# Patient Record
Sex: FEMALE | Race: WHITE | NOT HISPANIC OR LATINO | Employment: OTHER | URBAN - METROPOLITAN AREA
[De-identification: names, ages, dates, MRNs, and addresses within clinical notes are randomized per-mention and may not be internally consistent; named-entity substitution may affect disease eponyms.]

---

## 2019-03-29 ENCOUNTER — OFFICE VISIT - HEALTHEAST (OUTPATIENT)
Dept: ALLERGY | Facility: CLINIC | Age: 34
End: 2019-03-29

## 2019-03-29 DIAGNOSIS — Z91.018 FOOD ALLERGY: ICD-10-CM

## 2019-03-29 RX ORDER — PREDNISONE 20 MG/1
TABLET ORAL
Refills: 0 | Status: SHIPPED | COMMUNITY
Start: 2019-03-24 | End: 2021-11-11

## 2019-03-29 ASSESSMENT — MIFFLIN-ST. JEOR: SCORE: 1388.07

## 2019-04-01 ENCOUNTER — COMMUNICATION - HEALTHEAST (OUTPATIENT)
Dept: ALLERGY | Facility: CLINIC | Age: 34
End: 2019-04-01

## 2019-04-01 LAB
CARROT IGE QN: <0.35 KU/L
DEPRECATED MISC ALLERGEN IGE RAST QL: NORMAL
EGG WHITE IGE QN: <0.35 KU/L
Lab: <0.1 KU/L
PEANUT IGE QN: <0.35 KU/L
PORK IGE QN: <0.1 KU/L
SESAME SEED IGE QN: <0.1 KU(A)/L

## 2019-04-02 ENCOUNTER — COMMUNICATION - HEALTHEAST (OUTPATIENT)
Dept: ALLERGY | Facility: CLINIC | Age: 34
End: 2019-04-02

## 2021-05-27 NOTE — PROGRESS NOTES
Chief complaint: 2 episodes of anaphylaxis    History of present illness: This is a pleasant 34-year-old woman who is here today for evaluation of 2 episodes of anaphylaxis.  She reports that last Tuesday, March 19 she ate a sausage egg sandwich at [facility] around 1030.  She then states that 1 PM she went home.  She had eggs with some salsa perhaps and everything but the bagel seasoning.  She also had around 2:05 PM carrots with some peanut butter.  Around 2:26 PM, she felt a headache, some stomach pain, had diarrhea and developed hives all over her body.  She had also had that day alcohol he follow rice, venison pork sausage cheddar sticks that were homemade as well as some homemade hummus which contained garlic and sesame.  She went to the urgent care in her local area.  There she was noted to have a low oxygen saturation per the patient.  She was given Benadryl but was not given epinephrine.  She was also given steroids.  She states she had a steroid shot there and then was prescribed prednisone for 5 days.  She then notes the following Sunday, she had hives return after eating peanut butter, eggs and cauliflower rice.  She has had hives one other time in her life.  She states this is when she had mastitis. It was on the last day of the penicillin course and was attributed to the penicillin.  She states she did not take any medications the original day of the reaction.  She was not sick at that time.  She can think of nothing else unusual she did that day.  She does not have a history of environmental allergies or asthma.  She said no recurrence of the hives since last Sunday.  She does have a current epinephrine device.  This was prescribed in the urgent care.    Past medical history: Otherwise unremarkable, she did deliver a baby 6 months ago.    Social history:, No recent changes at home, works from home often she is a , no pets at home, central air, basement, non-smoker, no exposure to  "mold    Family history: No history of unexplained hives, no history of asthma or allergies    Review of Systems performed as above and the remainder is negative.          Current Outpatient Medications:      cholecalciferol, vitamin D3, 5,000 unit Tab, Vitamin D3 5,000 unit tablet  Take by oral route., Disp: , Rfl:      predniSONE (DELTASONE) 20 MG tablet, TK 3 TS FOR 3 DAYS. THEN TK 2 TS FOR 3 DAYS. THEN TK 1 T FOR 3 DAYS. THEN TK SS T FOR 2 DAYS, Disp: , Rfl: 0     prenatal 25/iron fum/folic/dha (PRENATAL-1 ORAL), Prenatal  one tablet each day, Disp: , Rfl:     Allergies   Allergen Reactions     Penicillins Hives     All \"cillins\"     Sulfa (Sulfonamide Antibiotics)        BP 92/60   Pulse 62   Resp 16   Ht 5' 6\" (1.676 m)   Wt 148 lb (67.1 kg)   BMI 23.89 kg/m    Gen: Pleasant female not in acute distress  HEENT: Eyes no erythema of the bulbar or palpebral conjunctiva, no edemaMouth: Throat clear, no lip or tongue edema.     Skin: No rashes or lesions  Psych: Alert and oriented times 3    8 percutaneous tests were placed.  Negative histamine control.    Impression report and plan:  1.  Anaphylaxis    Given her diarrhea, stomach pain and hives, this appears to have been anaphylaxis.  I am not sure what to make of the low oxygen saturation, however.  She states she was not wheezing and felt that she could breathe but had a little bit of tightness in her throat.  Continue to carry her epinephrine device for now.  I would like to check specific IgE for the foods that we did attempt to do skin testing today.  She had a negative histamine control today.  I would like her to avoid peanut and sesame, however, she states she might of had some sesame yesterday.  If negative, consider retest and perhaps challenge in 1 month.  Anaphylaxis action plan reviewed.  Stated that hives can come and go up to 6 weeks from an initial attack.  She will notify me if symptoms return.     Time spent with the patient, 30 minutes, " greater than half spent counseling and coordination of care regarding anaphylaxis and food allergy.

## 2021-05-27 NOTE — PATIENT INSTRUCTIONS - HE
Avoid peanut, sesame, carrot, pork, milk, egg, cauliflower for now    Check blood test    Epi at all times    If negative, retest peanut/sesame in 1 month and consider challenge

## 2021-05-30 ENCOUNTER — RECORDS - HEALTHEAST (OUTPATIENT)
Dept: ADMINISTRATIVE | Facility: CLINIC | Age: 36
End: 2021-05-30

## 2021-06-02 VITALS — WEIGHT: 148 LBS | BODY MASS INDEX: 23.78 KG/M2 | HEIGHT: 66 IN

## 2021-08-15 ENCOUNTER — HEALTH MAINTENANCE LETTER (OUTPATIENT)
Age: 36
End: 2021-08-15

## 2021-10-11 ENCOUNTER — HEALTH MAINTENANCE LETTER (OUTPATIENT)
Age: 36
End: 2021-10-11

## 2021-11-09 ENCOUNTER — LAB (OUTPATIENT)
Dept: LAB | Facility: OTHER | Age: 36
End: 2021-11-09
Attending: PLASTIC SURGERY
Payer: COMMERCIAL

## 2021-11-09 DIAGNOSIS — Z11.59 ENCOUNTER FOR SCREENING FOR OTHER VIRAL DISEASES: ICD-10-CM

## 2021-11-09 PROCEDURE — U0005 INFEC AGEN DETEC AMPLI PROBE: HCPCS

## 2021-11-09 PROCEDURE — U0003 INFECTIOUS AGENT DETECTION BY NUCLEIC ACID (DNA OR RNA); SEVERE ACUTE RESPIRATORY SYNDROME CORONAVIRUS 2 (SARS-COV-2) (CORONAVIRUS DISEASE [COVID-19]), AMPLIFIED PROBE TECHNIQUE, MAKING USE OF HIGH THROUGHPUT TECHNOLOGIES AS DESCRIBED BY CMS-2020-01-R: HCPCS

## 2021-11-10 ENCOUNTER — ANESTHESIA EVENT (OUTPATIENT)
Dept: SURGERY | Facility: AMBULATORY SURGERY CENTER | Age: 36
End: 2021-11-10

## 2021-11-10 LAB — SARS-COV-2 RNA RESP QL NAA+PROBE: NEGATIVE

## 2021-11-10 ASSESSMENT — MIFFLIN-ST. JEOR: SCORE: 1351.79

## 2021-11-11 ENCOUNTER — HOSPITAL ENCOUNTER (OUTPATIENT)
Facility: AMBULATORY SURGERY CENTER | Age: 36
End: 2021-11-11
Attending: PLASTIC SURGERY | Admitting: PLASTIC SURGERY
Payer: COMMERCIAL

## 2021-11-11 ENCOUNTER — ANESTHESIA (OUTPATIENT)
Dept: SURGERY | Facility: AMBULATORY SURGERY CENTER | Age: 36
End: 2021-11-11

## 2021-11-11 VITALS
HEART RATE: 64 BPM | RESPIRATION RATE: 16 BRPM | WEIGHT: 140 LBS | HEIGHT: 66 IN | SYSTOLIC BLOOD PRESSURE: 109 MMHG | DIASTOLIC BLOOD PRESSURE: 64 MMHG | OXYGEN SATURATION: 99 % | TEMPERATURE: 97.1 F | BODY MASS INDEX: 22.5 KG/M2

## 2021-11-11 DIAGNOSIS — R11.0 NAUSEA AFTER ANESTHESIA, INITIAL ENCOUNTER: ICD-10-CM

## 2021-11-11 DIAGNOSIS — R52 PAIN: Primary | ICD-10-CM

## 2021-11-11 DIAGNOSIS — T88.59XA NAUSEA AFTER ANESTHESIA, INITIAL ENCOUNTER: ICD-10-CM

## 2021-11-11 LAB — HCG UR QL: NEGATIVE

## 2021-11-11 PROCEDURE — G8916 PT W IV AB GIVEN ON TIME: HCPCS

## 2021-11-11 PROCEDURE — L8600 IMPLANT BREAST SILICONE/EQ: HCPCS

## 2021-11-11 PROCEDURE — 360N000090 HC SURGERY LEVEL COSMETIC 150 MIN

## 2021-11-11 PROCEDURE — G8907 PT DOC NO EVENTS ON DISCHARG: HCPCS

## 2021-11-11 PROCEDURE — 81025 URINE PREGNANCY TEST: CPT | Performed by: ANESTHESIOLOGY

## 2021-11-11 DEVICE — IMPLANTABLE DEVICE: Type: IMPLANTABLE DEVICE | Site: BREAST | Status: FUNCTIONAL

## 2021-11-11 RX ORDER — ONDANSETRON 2 MG/ML
INJECTION INTRAMUSCULAR; INTRAVENOUS PRN
Status: DISCONTINUED | OUTPATIENT
Start: 2021-11-11 | End: 2021-11-11

## 2021-11-11 RX ORDER — HYDROXYZINE HYDROCHLORIDE 25 MG/1
25 TABLET, FILM COATED ORAL
Status: DISCONTINUED | OUTPATIENT
Start: 2021-11-11 | End: 2021-11-12 | Stop reason: HOSPADM

## 2021-11-11 RX ORDER — FENTANYL CITRATE 50 UG/ML
25 INJECTION, SOLUTION INTRAMUSCULAR; INTRAVENOUS
Status: DISCONTINUED | OUTPATIENT
Start: 2021-11-11 | End: 2021-11-12 | Stop reason: HOSPADM

## 2021-11-11 RX ORDER — LIDOCAINE 40 MG/G
CREAM TOPICAL
Status: DISCONTINUED | OUTPATIENT
Start: 2021-11-11 | End: 2021-11-12 | Stop reason: HOSPADM

## 2021-11-11 RX ORDER — MEPERIDINE HYDROCHLORIDE 25 MG/ML
12.5 INJECTION INTRAMUSCULAR; INTRAVENOUS; SUBCUTANEOUS
Status: DISCONTINUED | OUTPATIENT
Start: 2021-11-11 | End: 2021-11-12 | Stop reason: HOSPADM

## 2021-11-11 RX ORDER — CEFAZOLIN SODIUM 2 G/100ML
2 INJECTION, SOLUTION INTRAVENOUS
Status: COMPLETED | OUTPATIENT
Start: 2021-11-11 | End: 2021-11-11

## 2021-11-11 RX ORDER — ONDANSETRON 4 MG/1
4 TABLET, ORALLY DISINTEGRATING ORAL EVERY 30 MIN PRN
Status: DISCONTINUED | OUTPATIENT
Start: 2021-11-11 | End: 2021-11-12 | Stop reason: HOSPADM

## 2021-11-11 RX ORDER — BUPIVACAINE HYDROCHLORIDE AND EPINEPHRINE 2.5; 5 MG/ML; UG/ML
INJECTION, SOLUTION INFILTRATION; PERINEURAL PRN
Status: DISCONTINUED | OUTPATIENT
Start: 2021-11-11 | End: 2021-11-11 | Stop reason: HOSPADM

## 2021-11-11 RX ORDER — DEXAMETHASONE SODIUM PHOSPHATE 4 MG/ML
10 INJECTION, SOLUTION INTRA-ARTICULAR; INTRALESIONAL; INTRAMUSCULAR; INTRAVENOUS; SOFT TISSUE
Status: COMPLETED | OUTPATIENT
Start: 2021-11-11 | End: 2021-11-11

## 2021-11-11 RX ORDER — ONDANSETRON 4 MG/1
4-8 TABLET, ORALLY DISINTEGRATING ORAL EVERY 8 HOURS PRN
Qty: 4 TABLET | Refills: 0
Start: 2021-11-11

## 2021-11-11 RX ORDER — SODIUM CHLORIDE, SODIUM LACTATE, POTASSIUM CHLORIDE, CALCIUM CHLORIDE 600; 310; 30; 20 MG/100ML; MG/100ML; MG/100ML; MG/100ML
INJECTION, SOLUTION INTRAVENOUS CONTINUOUS
Status: DISCONTINUED | OUTPATIENT
Start: 2021-11-11 | End: 2021-11-12 | Stop reason: HOSPADM

## 2021-11-11 RX ORDER — ACETAMINOPHEN 325 MG/1
975 TABLET ORAL ONCE
Status: COMPLETED | OUTPATIENT
Start: 2021-11-11 | End: 2021-11-11

## 2021-11-11 RX ORDER — HYDROXYZINE PAMOATE 25 MG/1
25 CAPSULE ORAL EVERY 6 HOURS PRN
Qty: 30 CAPSULE | Refills: 0
Start: 2021-11-11

## 2021-11-11 RX ORDER — OXYCODONE AND ACETAMINOPHEN 5; 325 MG/1; MG/1
2 TABLET ORAL
Status: DISCONTINUED | OUTPATIENT
Start: 2021-11-11 | End: 2021-11-12 | Stop reason: HOSPADM

## 2021-11-11 RX ORDER — ONDANSETRON 4 MG/1
4 TABLET, ORALLY DISINTEGRATING ORAL
Status: DISCONTINUED | OUTPATIENT
Start: 2021-11-11 | End: 2021-11-12 | Stop reason: HOSPADM

## 2021-11-11 RX ORDER — PROPOFOL 10 MG/ML
INJECTION, EMULSION INTRAVENOUS PRN
Status: DISCONTINUED | OUTPATIENT
Start: 2021-11-11 | End: 2021-11-11

## 2021-11-11 RX ORDER — CEFADROXIL 500 MG/1
500 CAPSULE ORAL EVERY 12 HOURS SCHEDULED
Status: DISCONTINUED | OUTPATIENT
Start: 2021-11-11 | End: 2021-11-12 | Stop reason: HOSPADM

## 2021-11-11 RX ORDER — FENTANYL CITRATE 50 UG/ML
INJECTION, SOLUTION INTRAMUSCULAR; INTRAVENOUS PRN
Status: DISCONTINUED | OUTPATIENT
Start: 2021-11-11 | End: 2021-11-11

## 2021-11-11 RX ORDER — OXYCODONE HYDROCHLORIDE 5 MG/1
5 TABLET ORAL EVERY 4 HOURS PRN
Status: DISCONTINUED | OUTPATIENT
Start: 2021-11-11 | End: 2021-11-12 | Stop reason: HOSPADM

## 2021-11-11 RX ORDER — LIDOCAINE HYDROCHLORIDE 20 MG/ML
INJECTION, SOLUTION INFILTRATION; PERINEURAL PRN
Status: DISCONTINUED | OUTPATIENT
Start: 2021-11-11 | End: 2021-11-11

## 2021-11-11 RX ORDER — OXYCODONE AND ACETAMINOPHEN 5; 325 MG/1; MG/1
1-2 TABLET ORAL EVERY 4 HOURS PRN
Status: DISCONTINUED | OUTPATIENT
Start: 2021-11-11 | End: 2021-11-12 | Stop reason: HOSPADM

## 2021-11-11 RX ORDER — ONDANSETRON 2 MG/ML
4 INJECTION INTRAMUSCULAR; INTRAVENOUS EVERY 30 MIN PRN
Status: DISCONTINUED | OUTPATIENT
Start: 2021-11-11 | End: 2021-11-12 | Stop reason: HOSPADM

## 2021-11-11 RX ORDER — FENTANYL CITRATE 50 UG/ML
25 INJECTION, SOLUTION INTRAMUSCULAR; INTRAVENOUS EVERY 5 MIN PRN
Status: DISCONTINUED | OUTPATIENT
Start: 2021-11-11 | End: 2021-11-12 | Stop reason: HOSPADM

## 2021-11-11 RX ORDER — EPHEDRINE SULFATE 50 MG/ML
INJECTION, SOLUTION INTRAMUSCULAR; INTRAVENOUS; SUBCUTANEOUS PRN
Status: DISCONTINUED | OUTPATIENT
Start: 2021-11-11 | End: 2021-11-11

## 2021-11-11 RX ORDER — PROPOFOL 10 MG/ML
INJECTION, EMULSION INTRAVENOUS CONTINUOUS PRN
Status: DISCONTINUED | OUTPATIENT
Start: 2021-11-11 | End: 2021-11-11

## 2021-11-11 RX ORDER — DIAZEPAM 5 MG
10 TABLET ORAL EVERY 12 HOURS PRN
Status: DISCONTINUED | OUTPATIENT
Start: 2021-11-11 | End: 2021-11-12 | Stop reason: HOSPADM

## 2021-11-11 RX ADMIN — EPHEDRINE SULFATE 10 MG: 50 INJECTION, SOLUTION INTRAMUSCULAR; INTRAVENOUS; SUBCUTANEOUS at 07:31

## 2021-11-11 RX ADMIN — FENTANYL CITRATE 75 MCG: 50 INJECTION, SOLUTION INTRAMUSCULAR; INTRAVENOUS at 07:22

## 2021-11-11 RX ADMIN — LIDOCAINE HYDROCHLORIDE 60 MG: 20 INJECTION, SOLUTION INFILTRATION; PERINEURAL at 07:05

## 2021-11-11 RX ADMIN — PROPOFOL 200 MCG/KG/MIN: 10 INJECTION, EMULSION INTRAVENOUS at 07:06

## 2021-11-11 RX ADMIN — CEFAZOLIN SODIUM 2 G: 2 INJECTION, SOLUTION INTRAVENOUS at 06:50

## 2021-11-11 RX ADMIN — Medication 15 MG: at 07:42

## 2021-11-11 RX ADMIN — FENTANYL CITRATE 25 MCG: 50 INJECTION, SOLUTION INTRAMUSCULAR; INTRAVENOUS at 07:05

## 2021-11-11 RX ADMIN — ONDANSETRON 4 MG: 2 INJECTION INTRAMUSCULAR; INTRAVENOUS at 09:26

## 2021-11-11 RX ADMIN — PROPOFOL 200 MG: 10 INJECTION, EMULSION INTRAVENOUS at 07:05

## 2021-11-11 RX ADMIN — Medication 1 MG: at 08:35

## 2021-11-11 RX ADMIN — SODIUM CHLORIDE, SODIUM LACTATE, POTASSIUM CHLORIDE, CALCIUM CHLORIDE: 600; 310; 30; 20 INJECTION, SOLUTION INTRAVENOUS at 06:28

## 2021-11-11 RX ADMIN — ACETAMINOPHEN 975 MG: 325 TABLET ORAL at 06:30

## 2021-11-11 RX ADMIN — DEXAMETHASONE SODIUM PHOSPHATE 10 MG: 4 INJECTION, SOLUTION INTRA-ARTICULAR; INTRALESIONAL; INTRAMUSCULAR; INTRAVENOUS; SOFT TISSUE at 07:16

## 2021-11-11 RX ADMIN — Medication 20 MG: at 07:27

## 2021-11-11 NOTE — BRIEF OP NOTE
[facility] Brief Operative Note    Pre-operative diagnosis: Hypomastia and grade I ptosis; localized fat accumulation of preaxilla   Post-operative diagnosis same   Procedure: Procedure(s):  Bilateral breast augmentation  Bilateral crescent mastopexies  LIPOSUCTION of Preaxillas   Surgeon(s): Surgeon(s) and Role:     * Elliot Vargas MD - Primary   Estimated blood loss: minimal   Specimens: none   Findings: none   Assist: Enriqueta Alvarez  Complications: none  Condition: extubated and stable to PAR    Elliot Vargas MD

## 2021-11-11 NOTE — ANESTHESIA PROCEDURE NOTES
Airway       Patient location during procedure: OR       Procedure Start/Stop Times: 11/11/2021 7:06 AM  Staff -        CRNA: Callie Araujo APRN CRNA       Performed By: anesthesiologist  Consent for Airway        Urgency: elective  Indications and Patient Condition       Indications for airway management: kamilla-procedural       Induction type:intravenous      Final Airway Details       Final airway type: supraglottic airway    Supraglottic Airway Details        Type: LMA       Brand: LMA Unique       LMA size: 4       Airway Seal Pressure (cm H2O): 18    Post intubation assessment        Placement verified by: capnometry, equal breath sounds and chest rise        Number of attempts at approach: 1       Number of other approaches attempted: 0       Secured with: commercial tube cabrera and plastic tape       Ease of procedure: easy       Dentition: Intact and Unchanged

## 2021-11-11 NOTE — ANESTHESIA CARE TRANSFER NOTE
Patient: Jazmyne Headley    Procedure: Procedure(s):  Bilateral breast augmentation  Bilateral crescent mastopexies  LIPOSUCTION of Preaxillas       Diagnosis: Encounter for cosmetic surgery [Z41.1]  Diagnosis Additional Information: No value filed.    Anesthesia Type:   General     Note:    Oropharynx: oropharynx clear of all foreign objects  Level of Consciousness: awake  Oxygen Supplementation: nasal cannula  Level of Supplemental Oxygen (L/min / FiO2): 3  Independent Airway: airway patency satisfactory and stable  Dentition: dentition unchanged  Vital Signs Stable: post-procedure vital signs reviewed and stable  Report to RN Given: handoff report given  Patient transferred to: PACU  Comments: Prior to atraumatic LMA removal, patient awake, good aeration, SpO2 99%, equal bilateral breath sounds.  Transported to PACU on oxygen  3lpm.  Patient awake, alert, SpO2 98% in PACU. No apparent anesthesia complications.   Handoff Report: Identifed the Patient, Identified the Reponsible Provider, Reviewed the pertinent medical history, Discussed the surgical course, Reviewed Intra-OP anesthesia mangement and issues during anesthesia, Set expectations for post-procedure period and Allowed opportunity for questions and acknowledgement of understanding      Vitals:  Vitals Value Taken Time   BP     Temp     Pulse 70 11/11/21 0948   Resp 14 11/11/21 0948   SpO2 100 % 11/11/21 0948   Vitals shown include unvalidated device data.    Electronically Signed By: BEAR Baca CRNA  November 11, 2021  9:49 AM

## 2021-11-11 NOTE — ANESTHESIA PREPROCEDURE EVALUATION
"Anesthesia Pre-Procedure Evaluation    Patient: Jazmyne Headley   MRN: [id number] : 1985        Preoperative Diagnosis: Encounter for cosmetic surgery [Z41.1]    Procedure : Procedure(s):  Bilateral breast augmentation  Bilateral crescent mastopexies  LIPOSUCTION of Preaxillas          History reviewed. No pertinent past medical history.   History reviewed. No pertinent surgical history.   Allergies   Allergen Reactions     Amoxicillin Hives     Penicillins Hives     All \"cillins\"     Sulfa (Sulfonamide Antibiotics) [Sulfa Drugs] Unknown      Social History     Tobacco Use     Smoking status: Never Smoker     Smokeless tobacco: Never Used   Substance Use Topics     Alcohol use: Not on file      Wt Readings from Last 1 Encounters:   11/10/21 63.5 kg (140 lb)        Anesthesia Evaluation   Pt has had prior anesthetic. Type: General.    No history of anesthetic complications       ROS/MED HX  ENT/Pulmonary:  - neg pulmonary ROS     Neurologic:  - neg neurologic ROS     Cardiovascular:  - neg cardiovascular ROS     METS/Exercise Tolerance:     Hematologic:  - neg hematologic  ROS     Musculoskeletal:  - neg musculoskeletal ROS     GI/Hepatic:  - neg GI/hepatic ROS     Renal/Genitourinary:  - neg Renal ROS     Endo:  - neg endo ROS     Psychiatric/Substance Use:  - neg psychiatric ROS     Infectious Disease:  - neg infectious disease ROS     Malignancy:  - neg malignancy ROS     Other:  - neg other ROS          Physical Exam    Airway  airway exam normal           Respiratory Devices and Support         Dental  no notable dental history         Cardiovascular   cardiovascular exam normal          Pulmonary   pulmonary exam normal                OUTSIDE LABS:  CBC: No results found for: WBC, HGB, HCT, PLT  BMP: No results found for: NA, POTASSIUM, CHLORIDE, CO2, BUN, CR, GLC  COAGS: No results found for: PTT, INR, FIBR  POC:   Lab Results   Component Value Date    HCG Negative 2021     HEPATIC: No results " found for: ALBUMIN, PROTTOTAL, ALT, AST, GGT, ALKPHOS, BILITOTAL, BILIDIRECT, DIONNE  OTHER:   Lab Results   Component Value Date    A1C 5.3 03/04/2016       Anesthesia Plan    ASA Status:  1   NPO Status:  NPO Appropriate    Anesthesia Type: General.     - Airway: LMA   Induction: Intravenous, Propofol.   Maintenance: Balanced.        Consents    Anesthesia Plan(s) and associated risks, benefits, and realistic alternatives discussed. Questions answered and patient/representative(s) expressed understanding.     - Discussed with:  Patient      - Extended Intubation/Ventilatory Support Discussed: No.      - Patient is DNR/DNI Status: No    Use of blood products discussed: No .     Postoperative Care    Pain management: IV analgesics, Oral pain medications.   PONV prophylaxis: Ondansetron (or other 5HT-3), Background Propofol Infusion     Comments:                Geoffrey Horowitz MD

## 2021-11-11 NOTE — DISCHARGE INSTRUCTIONS
[facility]  Same-Day Surgery   Adult Discharge Orders & Instructions   For 24 hours after surgery  1. Get plenty of rest.  A responsible adult must stay with you for at least 24 hours after you leave the hospital.   2. Do not drive or use heavy equipment.  If you have weakness or tingling, don't drive or use heavy equipment until this feeling goes away.  3. Do not drink alcohol.  4. Avoid strenuous or risky activities.  Ask for help when climbing stairs.   5. You may feel lightheaded.  IF so, sit for a few minutes before standing.  Have someone help you get up.   6. If you have nausea (feel sick to your stomach): Drink only clear liquids such as apple juice, ginger ale, broth or 7-Up.  Rest may also help.  Be sure to drink enough fluids.  Move to a regular diet as you feel able.  7. You may have a slight fever. Call the doctor if your fever is over 100 F (37.7 C) (taken under the tongue) or lasts longer than 24 hours.  8. You may have a dry mouth, a sore throat, muscle aches or trouble sleeping.  These should go away after 24 hours.  9. Do not make important or legal decisions.   Call your doctor for any of the followin.  Signs of infection (fever, growing tenderness at the surgery site, a large amount of drainage or bleeding, severe pain, foul-smelling drainage, redness, swelling).    2. It has been over 8 to 10 hours since surgery and you are still not able to urinate (pass water).    3.  Headache for over 24 hours.    4.  Numbness, tingling or weakness the day after surgery (if you had spinal anesthesia).

## 2021-11-11 NOTE — ANESTHESIA POSTPROCEDURE EVALUATION
Patient: Jazmyne Headley    Procedure: Procedure(s):  Bilateral breast augmentation  Bilateral crescent mastopexies  LIPOSUCTION of Preaxillas       Diagnosis:Encounter for cosmetic surgery [Z41.1]  Diagnosis Additional Information: No value filed.    Anesthesia Type:  General    Note:  Disposition: Outpatient   Postop Pain Control: Uneventful            Sign Out: Well controlled pain   PONV: No   Neuro/Psych: Uneventful            Sign Out: Acceptable/Baseline neuro status   Airway/Respiratory: Uneventful            Sign Out: Acceptable/Baseline resp. status   CV/Hemodynamics: Uneventful            Sign Out: Acceptable CV status; No obvious hypovolemia; No obvious fluid overload   Other NRE: NONE   DID A NON-ROUTINE EVENT OCCUR? No           Last vitals:  Vitals Value Taken Time   /75 11/11/21 1015   Temp 97  F (36.1  C) 11/11/21 0946   Pulse 54 11/11/21 1015   Resp 13 11/11/21 1015   SpO2 100 % 11/11/21 1015   Vitals shown include unvalidated device data.    Electronically Signed By: Geoffrey Horowitz MD  November 11, 2021  3:20 PM

## 2021-11-13 NOTE — OP NOTE
PREOPERATIVE DIAGNOSES:   1.  Bilateral hypomastia.   2.  Significant postpartum involution.   3. Grade 1 1/2 breast ptosis  4. Localized fat accumulation of bra strap area   POSTOPERATIVE DIAGNOSES:     1.  Bilateral hypomastia.   2.  Significant postpartum involution.    3. Grade 1 1/2 breast ptosis  4. Localized fat accumulation of bra strap area     PROCEDURES PERFORMED:   1. Bilateral augmentation mammoplasty through an inframammary fold incision with the implant in a submuscular position.      2. Bilateral [place] mastopexies  3. Liposuction of bra strap areas.    IMPLANTS:     1.  Left breast implant: Sientra HSC smooth round high profile, high strength cohesive gel breast implant.  Reference number [id number], serial number is [id number].   2.  Right breast implant:  Sientra HSC smooth round high profile cohesive gel breast implant, reference number [id number], serial number is [id number].      SURGEON:  Elliot Vargas MD      ANESTHETIC:  General, utilizing a laryngeal masked airway.      INDICATIONS FOR PROCEDURE:  The patient is a very pleasant, attractive 36-year-old female who has seen us in consultation regarding breast augmentation.  She has a grade 1 1/2 breast ptosis and was told she would need  crescent mastopexies. The patient is bothered by localized fat accumulation of her bra strap area which will be treated with liposcutionThe patient understands that breast implants are not considered a lifetime medical device and she will have to replace them within her lifetime.  The current generation of Sientra gel breast implants do carry a lifetime warranty.  The patient understands there is a phenomenon known as silent rupture where the implant may fail and neither the surgeon or patient are aware of this.  I recommend MRI surveillance for silent rupture at 8-10 years postoperatively.      The patient understands there is a phenomenon known as capsular contracture where the body may form an  aggressive scar capsule around the breast implant.  The rate of capsular contracture in my practice is approximately 2% when implants placed in submuscular position through an inframammary fold incision as used for the case for this patient.  The patient was told there are 4 maneuvers I utilize to diminish her chances of capsular contracture.  The first is submuscular breast implant placement.  Submuscular breast implant placement has been shown to confer a 3-4 fold decrease in rate of capsular contracture compared with subglandular breast augmentation.  Subglandular breast augmentation carries a capsular contracture rate in the 12% to 16% range, and this can be lowered to the 3% to 4% by placing the implant in a submuscular position.  Use of an inframammary fold incision for placement of the implant has been shown to confer a 10 fold reduction of capsular contracture in clinical studies performed by Deshaun Alexandra MD from [facility].  In his study, the rate of capsular contracture was 9.5% when the implant is placed through a periareolar incision.  This was lowered to 0.59% when an inframammary fold incision was utilized for placing the implant.  Use of breast pocket irrigations consisting of Ancef, gentamicin and bacitracin has been shown to decrease the rate of capsular contracture in clinical studies performed by Lai Bustos MD from the [facility].  The last maneuver I utilized is the use of a Velasco funnel for implant placement.  A Velasco funnel allows for a no-touch technique, which theoretically should decrease the chances of developing a biofilm and therefore capsular contracture.  The patient is 45 years of age.  She had a clear mammogram prior to surgery.  The patient understands that she needs to tell her mammographer she has breast implants.  Special mammographic views known as Nae views are used to best visualize the breasts following augmentation  mammoplasty.  The patient was told there is a condition as breast implant-associated lymphoma.  Approximately 400-500 cases have been reported in worldwide literature to date and all have been shown to occur in patients where the surgeon and patient chose to use a textured implant.  In my practice, dating to 1998, I have never used textured implants.  I only use smooth breast implants in my practice.  There are no cases of breast implant-associated lymphoma which have occurred with use of smooth round breast implants.  The patient understands her incision will be in the inframammary fold.  She understands the risks of the operation include first and foremost the chance of capsular contracture, followed by implant malposition, asymmetry, hypertrophic scarring and possible dissatisfaction with cosmetic outcome.  Complications such as bleeding and infection are extremely rare in my practice.  I have had 1 postoperative bleed and 1 single cosmetic breast augmentation become infected in my practice dating to 1994.  The patient has been given a chance to ask questions and all were answered.  The patient provides her informed consent for the procedure.      DESCRIPTION OF PROCEDURE AND FINDINGS:  In the preoperative holding area, the inframammary fold incision was marked and consent was obtained.  This consent is in addition to our in-office consent she signed at her preoperative evaluation.  The patient was taken to the operating room, placed in supine position on the operating room table.  A successful general anesthetic was induced using a laryngeal mask airway.  The patient received 2 grams of Ancef and 10 mg of Decadron intravenously prior to commencing with surgery.  Her chest was then prepped and draped in routine sterile fashion.  The infra-areolar complexes were covered with 3M Tegaderm dressings to drape them completely out of the sterile field.    Incision was made in the right inframammary fold.  Incision was  carried through the dermis down to the pectoralis major muscle.  I then switched to a guarded blade tip Bovie electrocautery.  I dissected several centimeters above the inframammary fold on the clavipectoral fascia.  Then, using a Kimberly retractor, I established a submuscular plane.  The muscle was partially released from 3 o'clock to 6 o'clock on the right side.  Intercostal perforating vessels were cauterized using an insulated DeBakey type monopolar forceps as I came upon them.  Finger dissection was used laterally to avoid any traction on the fourth intercostal nerve.  Superiorly, dissection was made between the pectoralis major and pectoralis minor muscles in gentle sweeping fashion using a uterine sound.  A sizer was placed and filled with air, which gave nice shape to her breast.  This also identified areas where further attenuation of the medial fibers of the pectoralis major was needed and this was accomplished surgically.  The right breast pocket was irrigated with solution consisting of 1 gram of Ancef, 80 mg of gentamicin and bacitracin in injectable saline.  This was done in accordance with studies by Lai Bustos MD, from the [facility] as a clinically proven means of diminishing the chances of capsular contracture.  The dissection pocket was then injected with 0.25% Marcaine with Kenalog for postoperative analgesia and for the anti-inflammatory properties of steroids.      Attention was then directed to the left side, where the identical operation was performed.  On the left side, the muscle was partially released from 6 o'clock to 9 o'clock.  Again, a sizer was placed and filled with air, which gave nice shape to her breast.  This identified areas were further attenuation of the medial fibers of pectoralis major was needed and this was accomplished.  All perforating vessels were cauterized using an awl medially based.  Perforating vessels were cauterized using an insulated bayonet  style guarded monopolar forceps.  Hemostasis was excellent.  The left breast pocket was irrigated with the Ancef, gentamicin, bacitracin solution.  At this point, all operative personnel changed their gloves.  The implants were opened on the back table and soaked in the antibiotic solution.  A Velasco funnel was opened and trimmed to the appropriate size for a 325 mL implant.  The funnel was lubricated with the antibiotic solution.  The implant was transferred to the Velasco funnel and then the Velasco funnel was utilized to insert the breast implant in the right breast pocket utilizing a no-touch technique.  Closure consisted of 3-0 Vicryl sutures in the muscular layer.  Deep parenchymal layer was closed using 4-0 PDS in buried and interrupted deep dermal fashion.  The skin of the hiatal fold incision was closed using 4-0 Monocryl suture in running and buried intracuticular fashion.  The skin was then run using 4-0 Prolene suture in running and buried continuous intracuticular fashion.  The inframammary fold incision was dressed using Mastisol and 3M Steri-Strips.      Attention was then directed to the left side.  The left side had been irrigated with the Ancef, gentamicin and bacitracin solution and injected with 0.25% Marcaine with 1:100,000 epinephrine for analgesia.  At this point, all operative personnel changed their gloves.  The implant was soaked in the antibiotic solution.  The Velasco funnel was lubricated with antibiotic solution.  The implant was transferred from its sterile container to the Velasco funnel.  The implant was inserted into the pocket utilizing a no-touch technique.  Closure was identical from left to right.       At this point, the crescent mastopexy was performed.  The premarked crescent of skin was excised.  All bleeding points were made hemostatic.  Inset of the crescent mastopexy was performed using 4-0 PDS sutures in buried interrupted deep dermal fashion.  The skin was then run using  3-0 Prolene suture in running and buried continuous intracuticular fashion, 6-0 Prolene sutures were placed for exact coaptation of the skin.  Dressing consisted of Mastisol at the crescent mastopexy incision as well as the inframammary fold incision.  3M half-inch Steri-Strips were placed in longitudinal fashion in the direction of the inframammary fold incision.  The crescent mastopexy was dressed using half-inch Steri-Strips cut into quarters and placed in sofia-hexagonal fashion.     At this point wetting solution was infiltrated into the localized fat accumulation of her bra strap areas bilaterally,Jazmyne Headley is a 36 year old female who presents today for performed liposuction using curved Mladick cannulas. Closure of the liposcution access incisions consisted of a simple interrupted prolene suture.       This was followed by application of. Nitro-Bid paste, a square of Adaptic and a surgical bra.    Complications: none    EBL: minimal    Elliot Vargas MD

## 2022-09-25 ENCOUNTER — HEALTH MAINTENANCE LETTER (OUTPATIENT)
Age: 37
End: 2022-09-25

## 2023-01-30 ENCOUNTER — HEALTH MAINTENANCE LETTER (OUTPATIENT)
Age: 38
End: 2023-01-30

## 2024-03-02 ENCOUNTER — HEALTH MAINTENANCE LETTER (OUTPATIENT)
Age: 39
End: 2024-03-02

## (undated) DEVICE — SYR BULB IRRIG DOVER 60 ML LATEX FREE 67000

## (undated) DEVICE — ESU PENCIL SMOKE EVAC W/ROCKER SWITCH 0703-047-000

## (undated) DEVICE — DRAPE BREAST/CHEST 29420

## (undated) DEVICE — SU PDS II 3-0 PS-2 18" Z497G

## (undated) DEVICE — GLOVE PROTEXIS W/NEU-THERA 7.5  2D73TE75

## (undated) DEVICE — ESU ELEC BLADE 2.75" COATED/INSULATED E1455

## (undated) DEVICE — SPECIMEN CONTAINER 4OZ

## (undated) DEVICE — SU MONOCRYL 4-0 P-3 18" UND Y494G

## (undated) DEVICE — PACK MINOR SBA15MIFSE

## (undated) DEVICE — SUCTION TUBING 10' N1010

## (undated) DEVICE — DRSG STERI STRIP 1/2X4" R1547

## (undated) DEVICE — SYR 50ML LL W/O NDL 309653

## (undated) DEVICE — NDL 19GA 1.5"

## (undated) DEVICE — SU PROLENE 4-0 PS-2 18" 8682G

## (undated) DEVICE — BLADE KNIFE SURG 15 371115

## (undated) DEVICE — SUCTION CANISTER MEDIVAC LINER 1500ML W/LID 65651-515

## (undated) DEVICE — TUBING INFUSION INFILTRATION LIPOSUCTION 156" 24-6008

## (undated) DEVICE — DRSG TEGADERM 2 3/8X2 3/4" 1624W

## (undated) DEVICE — ESU ELEC BLADE 6" COATED/INSULATED E1455-6

## (undated) DEVICE — SUCTION TIP YANKAUER W/O VENT K86

## (undated) DEVICE — ESU NDL COLORADO MICRO 3CM STR N103A

## (undated) DEVICE — GOWN XLG DISP 9545

## (undated) DEVICE — SU PROLENE 6-0 P-1 18" 8697G

## (undated) DEVICE — SU VICRYL 3-0 SH 27" UND J416H

## (undated) DEVICE — PREP CHLORAPREP 26ML TINTED ORANGE  260815

## (undated) DEVICE — SOL NACL 0.9% INJ 100ML BAG 2B1307

## (undated) DEVICE — STPL SKIN 35W 054887

## (undated) DEVICE — ADH LIQUID MASTISOL TOPICAL VIAL 2-3ML 0523-48

## (undated) DEVICE — SU PDS II 4-0 P-3 18" Z494G

## (undated) DEVICE — NDL SPINAL 25GA 3.5" QUINCKE 405180

## (undated) DEVICE — DRSG ADAPTIC 3X8" 6113

## (undated) DEVICE — GLOVE PROTEXIS BLUE W/NEU-THERA 7.0  2D73EB70

## (undated) DEVICE — SOL WATER IRRIG 1000ML BOTTLE 07139-09

## (undated) RX ORDER — ACETAMINOPHEN 325 MG/1
TABLET ORAL
Status: DISPENSED
Start: 2021-11-11

## (undated) RX ORDER — BUPIVACAINE HYDROCHLORIDE 2.5 MG/ML
INJECTION, SOLUTION INFILTRATION; PERINEURAL
Status: DISPENSED
Start: 2021-11-11

## (undated) RX ORDER — TRIAMCINOLONE ACETONIDE 40 MG/ML
INJECTION, SUSPENSION INTRA-ARTICULAR; INTRAMUSCULAR
Status: DISPENSED
Start: 2021-11-11

## (undated) RX ORDER — PROPOFOL 10 MG/ML
INJECTION, EMULSION INTRAVENOUS
Status: DISPENSED
Start: 2021-11-11

## (undated) RX ORDER — DEXAMETHASONE SODIUM PHOSPHATE 4 MG/ML
INJECTION, SOLUTION INTRA-ARTICULAR; INTRALESIONAL; INTRAMUSCULAR; INTRAVENOUS; SOFT TISSUE
Status: DISPENSED
Start: 2021-11-11

## (undated) RX ORDER — GENTAMICIN 40 MG/ML
INJECTION, SOLUTION INTRAMUSCULAR; INTRAVENOUS
Status: DISPENSED
Start: 2021-11-11

## (undated) RX ORDER — EPINEPHRINE 1 MG/ML
INJECTION, SOLUTION INTRAMUSCULAR; SUBCUTANEOUS
Status: DISPENSED
Start: 2021-11-11

## (undated) RX ORDER — FENTANYL CITRATE 50 UG/ML
INJECTION, SOLUTION INTRAMUSCULAR; INTRAVENOUS
Status: DISPENSED
Start: 2021-11-11

## (undated) RX ORDER — LIDOCAINE HYDROCHLORIDE 10 MG/ML
INJECTION, SOLUTION EPIDURAL; INFILTRATION; INTRACAUDAL; PERINEURAL
Status: DISPENSED
Start: 2021-11-11

## (undated) RX ORDER — EPHEDRINE SULFATE 50 MG/ML
INJECTION, SOLUTION INTRAMUSCULAR; INTRAVENOUS; SUBCUTANEOUS
Status: DISPENSED
Start: 2021-11-11

## (undated) RX ORDER — ONDANSETRON 2 MG/ML
INJECTION INTRAMUSCULAR; INTRAVENOUS
Status: DISPENSED
Start: 2021-11-11

## (undated) RX ORDER — CEFAZOLIN SODIUM 1 G/3ML
INJECTION, POWDER, FOR SOLUTION INTRAMUSCULAR; INTRAVENOUS
Status: DISPENSED
Start: 2021-11-11